# Patient Record
(demographics unavailable — no encounter records)

---

## 2024-10-11 NOTE — HISTORY OF PRESENT ILLNESS
[No Major Concerns] : No major concerns [TWNoteComboBox1] : 9th Grade [FreeTextEntry1] : Ethan struggled in the first few weeks of school but is doing better now. [de-identified] : some concerns [de-identified] : some excessive talking in school, better with preferential seating. [Major Illness] : no major illness [Major Injury] : no major injury [Surgery] : no surgery [Hospitalizations] : no hospitalizations [New Medications] : no new medication [New Allergies] : no new allergies

## 2024-10-11 NOTE — PLAN
[Careful Teacher Selection] : - Next year's teacher(s) should be carefully selected to ensure a favorable fit [Continue IEP] : - Continue services as presently provided for in the Individualized Education Program [IEP Accomm. & Testing Modifications: ____] : - The IEP should  include accommodations and testing modifications. The plan should provide, at a minimum, for extended time for testing, and the opportunity to take or finish tests in a quiet, separate location. Additional accommodations recommended for this child are:  [unfilled] [Instruction in Executive Function Skills] : - Direct, individualized instruction in executive function-related skills: i.e. task analysis, planning, organization, study strategies, memorization [Monitor Attention] : - [unfilled]'s attention skills will need to continue to be monitored [Individual In-School Counseling] : - Individual counseling weekly with school psychologist or  [Social Skills] : - Social skills training [Social Skills Group (child)] : - Enrollment of child in a social skills development group [Psychotherapy (child)] : - Psychotherapy for child [Fish Oil] : - Dietary supplementation with fish oil as a source of omega-3 fatty acids - guidelines and cautions discussed [Follow-up visit (re-evaluation): _____] : - Follow-up visit in [unfilled]  for re-evaluation. [CAPS] : - CAPS form completed 1-2 days before the visit. [IEP or IFSP] : - Copy of most recent Individualized Education Program (IEP) or Family Service Plan (IFSP) [Test reports] : - Reports of most recent psychological, educational, speech/language, PT, OT test results [Accuracy] : Accuracy and reliability of clinical impressions [Findings (To Date)] : Findings from evaluation (to date) [Clinical Basis] : Clinical basis for current diagnosis and clinical impressions [Dev. Therapies: ____] : Benefits and limits of developmental therapies: [unfilled] [CAM Therapies] : Benefits and limits of CAM therapies [Counseling] : Benefits and limits of counseling or therapy [Behavior Modification] : Behavior modification strategies [Family Questions] : Family's questions were addressed [Diet] : Evidence-based clinical information about diet [Sleep] : The importance of sleep and strategies to ensure adequate sleep [Media / Screen Time] : Importance of limiting electronics, media, and screen time [FreeTextEntry3] : parents prefer to avoid medication [FreeTextEntry2] : preferential seating, refocus/redirect. [FreeTextEntry8] : saffron, magnesium

## 2024-10-11 NOTE — REASON FOR VISIT
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 Ethan had a good week at school this week. Ethan now has preferential seating.and did improve with this accomodation. [TextEntry] : Telemedicine audiovisual 2 way follow up visit with consent. Mother and child in [facility]. [place] Avery in [facility].

## 2025-04-30 NOTE — PLAN
[Careful Teacher Selection] : - Next year's teacher(s) should be carefully selected to ensure a favorable fit [Continue IEP] : - Continue services as presently provided for in the Individualized Education Program [IEP Accomm. & Testing Modifications: ____] : - The IEP should  include accommodations and testing modifications. The plan should provide, at a minimum, for extended time for testing, and the opportunity to take or finish tests in a quiet, separate location. Additional accommodations recommended for this child are:  [unfilled] [Instruction in Executive Function Skills] : - Direct, individualized instruction in executive function-related skills: i.e. task analysis, planning, organization, study strategies, memorization [Monitor Attention] : - [unfilled]'s attention skills will need to continue to be monitored [Individual In-School Counseling] : - Individual counseling weekly with school psychologist or  [Social Skills] : - Social skills training [Private Educational Tutoring] : - Private educational tutoring [Fish Oil] : - Dietary supplementation with fish oil as a source of omega-3 fatty acids - guidelines and cautions discussed [Follow-up visit (re-evaluation): _____] : - Follow-up visit in [unfilled]  for re-evaluation. [CAPS] : - CAPS form completed 1-2 days before the visit. [IEP or IFSP] : - Copy of most recent Individualized Education Program (IEP) or Family Service Plan (IFSP) [Test reports] : - Reports of most recent psychological, educational, speech/language, PT, OT test results [FreeTextEntry2] : preferential seating, refocus/redirect, Note taking assistance, homework /organizational assistance.  [FreeTextEntry8] : saffron, magnesium [Accuracy] : Accuracy and reliability of clinical impressions [Findings (To Date)] : Findings from evaluation (to date) [Clinical Basis] : Clinical basis for current diagnosis and clinical impressions [Dev. Therapies: ____] : Benefits and limits of developmental therapies: [unfilled] [CAM Therapies] : Benefits and limits of CAM therapies [Counseling] : Benefits and limits of counseling or therapy [Behavior Modification] : Behavior modification strategies [Family Questions] : Family's questions were addressed [Diet] : Evidence-based clinical information about diet [Sleep] : The importance of sleep and strategies to ensure adequate sleep [Media / Screen Time] : Importance of limiting electronics, media, and screen time

## 2025-04-30 NOTE — REASON FOR VISIT
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 Ethan now has preferential seating.and did improve with this accomodation.

## 2025-04-30 NOTE — HISTORY OF PRESENT ILLNESS
[TWNoteComboBox1] : 9th Grade [No Major Concerns] : No major concerns [FreeTextEntry1] : Ethan struggled in the first few weeks of school but is doing better now. [de-identified] : some concerns [de-identified] : some concerns [de-identified] :  better with preferential seating. [Major Illness] : no major illness [Major Injury] : no major injury [Surgery] : no surgery [Hospitalizations] : no hospitalizations [New Medications] : no new medication [New Allergies] : no new allergies

## 2025-04-30 NOTE — PHYSICAL EXAM
[Normal] : patient has a normal gait [Attention Intact] : attention intact [Fidgets] : does not fidget [Well-behaved during visit] : well-behaved during visit [Appropriate eye contact] : appropriate eye contact [Smiles responsively] : smiles responsively [Positive mood] : positive mood [Answered questions appropriately] : answered questions appropriately [Social referencing noted] : social referencing noted

## 2025-06-26 NOTE — DISCUSSION/SUMMARY
[Normal Growth] : growth [Normal Development] : development  [No Elimination Concerns] : elimination [Continue Regimen] : feeding [No Skin Concerns] : skin [Normal Sleep Pattern] : sleep [None] : no medical problems [Anticipatory Guidance Given] : Anticipatory guidance addressed as per the history of present illness section [Physical Growth and Development] : physical growth and development [Social and Academic Competence] : social and academic competence [Emotional Well-Being] : emotional well-being [Risk Reduction] : risk reduction [Violence and Injury Prevention] : violence and injury prevention [No Vaccines] : no vaccines needed [No Medications] : ~He/She~ is not on any medications [Patient] : patient [Parent/Guardian] : Parent/Guardian [Full Activity without restrictions including Physical Education & Athletics] : Full Activity without restrictions including Physical Education & Athletics [I have examined the above-named student and completed the preparticipation physical evaluation. The athlete does not present apparent clinical contraindications to practice and participate in sport(s) as outlined above. A copy of the physical exam is on r] : I have examined the above-named student and completed the preparticipation physical evaluation. The athlete does not present apparent clinical contraindications to practice and participate in sport(s) as outlined above. A copy of the physical exam is on record in my office and can be made available to the school at the request of the parents. If conditions arise after the athlete has been cleared for participation, the physician may rescind the clearance until the problem is resolved and the potential consequences are completely explained to the athlete (and parents/guardians). [FreeTextEntry1] : Continue balanced diet with all food groups. Brush teeth twice a day with toothbrush. Recommend visit to dentist. Maintain consistent daily routines and sleep schedule. Personal hygiene, puberty, and sexual health reviewed. Risky behaviors assessed. Limit screen time to no more than 2 hours. Encourage physical activity daily.  labs Follow up in 1 year for well visit